# Patient Record
Sex: FEMALE | Race: WHITE | NOT HISPANIC OR LATINO | URBAN - METROPOLITAN AREA
[De-identification: names, ages, dates, MRNs, and addresses within clinical notes are randomized per-mention and may not be internally consistent; named-entity substitution may affect disease eponyms.]

---

## 2022-12-06 ENCOUNTER — TELEPHONE ENCOUNTER (OUTPATIENT)
Dept: URBAN - METROPOLITAN AREA CLINIC 27 | Facility: CLINIC | Age: 52
End: 2022-12-06

## 2022-12-06 ENCOUNTER — OFFICE VISIT (OUTPATIENT)
Dept: URBAN - METROPOLITAN AREA CLINIC 27 | Facility: CLINIC | Age: 52
End: 2022-12-06
Payer: COMMERCIAL

## 2022-12-06 ENCOUNTER — WEB ENCOUNTER (OUTPATIENT)
Dept: URBAN - METROPOLITAN AREA CLINIC 27 | Facility: CLINIC | Age: 52
End: 2022-12-06

## 2022-12-06 ENCOUNTER — DASHBOARD ENCOUNTERS (OUTPATIENT)
Age: 52
End: 2022-12-06

## 2022-12-06 VITALS
BODY MASS INDEX: 28.51 KG/M2 | HEART RATE: 84 BPM | HEIGHT: 64 IN | WEIGHT: 167 LBS | SYSTOLIC BLOOD PRESSURE: 137 MMHG | DIASTOLIC BLOOD PRESSURE: 100 MMHG

## 2022-12-06 DIAGNOSIS — Z83.71 FAMILY HISTORY OF COLONIC POLYPS: ICD-10-CM

## 2022-12-06 DIAGNOSIS — R13.10 DYSPHAGIA: ICD-10-CM

## 2022-12-06 DIAGNOSIS — R10.9 RIGHT SIDED ABDOMINAL PAIN: ICD-10-CM

## 2022-12-06 DIAGNOSIS — K21.9 GERD: ICD-10-CM

## 2022-12-06 DIAGNOSIS — R19.7 DIARRHEA: ICD-10-CM

## 2022-12-06 PROCEDURE — 99244 OFF/OP CNSLTJ NEW/EST MOD 40: CPT | Performed by: INTERNAL MEDICINE

## 2022-12-06 PROCEDURE — 99204 OFFICE O/P NEW MOD 45 MIN: CPT | Performed by: INTERNAL MEDICINE

## 2022-12-06 RX ORDER — HYOSCYAMINE SULFATE 0.12 MG/1
1 OR 2 TABLETS AS NEEDED FOR ABDOMINAL PAIN TABLET ORAL EVERY 6 HOURS
Qty: 90 | Refills: 2 | OUTPATIENT
Start: 2022-12-06 | End: 2023-03-06

## 2022-12-06 RX ORDER — PANTOPRAZOLE SODIUM 40 MG/1
1 TABLET TABLET, DELAYED RELEASE ORAL TWICE A DAY
Qty: 60 TABLET | Refills: 2 | OUTPATIENT

## 2022-12-06 NOTE — HPI-TODAY'S VISIT:
Patient here at the request of Pepe Peñaloza and Yuly for evaluation of multiple GI symptoms that have been present for the past few months.  She has relatively constant right-sided abdominal pain which waxes and wanes.  She denies any exacerbating factors, although sometimes p.o. intake will cause a "heavy feeling".  It is sometimes better with movement.  It is sometimes nocturnal.  PRN Tylenol has been helpful.  The pain has not worsened over the past 3 months.  It is relatively deep.  She has persistent reflux symptoms for which she is currently taking OTC Nexium once daily.  This is not effective.  She is not adherent to an antireflux regimen.  She has occasional mild dysphagia/globus type sensation symptoms.  She does not need to regurgitate the food bolus.  She denies significant lodging of the food bolus.  She had some "black stools" last month but her stools appear to be normal in color now.  She has not had any dark/bright red rectal bleeding.  She often has loose stools; she will sometimes have 2-3 loose/liquidy stools in the morning.  They are not nocturnal.  She is not currently using Imodium.  She does take a probiotic and a fiber supplement.  She has gained nearly 40 pounds over the past 3 years.  She does have mild anxiety but does not think that this plays a role in her GI symptoms.  She has not had a prior upper endoscopy but last underwent colonoscopy 5 years ago; no polyps.  Recent abdominal sonography showed hepatomegaly with diffuse fatty infiltration.  Postcholecystectomy changes were seen with a common bile duct measuring 8.5 mm.  Recent CBC and CMP were normal.  There is no family history of colon cancer but her mother, father and brother all had polyps.

## 2022-12-07 ENCOUNTER — LAB OUTSIDE AN ENCOUNTER (OUTPATIENT)
Dept: URBAN - METROPOLITAN AREA CLINIC 27 | Facility: CLINIC | Age: 52
End: 2022-12-07

## 2022-12-07 PROBLEM — 235595009 GASTROESOPHAGEAL REFLUX DISEASE: Status: ACTIVE | Noted: 2022-12-06

## 2022-12-07 PROBLEM — 40739000 DYSPHAGIA: Status: ACTIVE | Noted: 2022-12-06

## 2022-12-07 PROBLEM — 38341003 HYPERTENSION: Status: ACTIVE | Noted: 2022-12-06

## 2022-12-20 ENCOUNTER — WEB ENCOUNTER (OUTPATIENT)
Dept: URBAN - METROPOLITAN AREA SURGERY CENTER 7 | Facility: SURGERY CENTER | Age: 52
End: 2022-12-20

## 2022-12-22 ENCOUNTER — CLAIMS CREATED FROM THE CLAIM WINDOW (OUTPATIENT)
Dept: URBAN - METROPOLITAN AREA SURGERY CENTER 7 | Facility: SURGERY CENTER | Age: 52
End: 2022-12-22
Payer: COMMERCIAL

## 2022-12-22 ENCOUNTER — CLAIMS CREATED FROM THE CLAIM WINDOW (OUTPATIENT)
Dept: URBAN - METROPOLITAN AREA CLINIC 4 | Facility: CLINIC | Age: 52
End: 2022-12-22
Payer: COMMERCIAL

## 2022-12-22 ENCOUNTER — OFFICE VISIT (OUTPATIENT)
Dept: URBAN - METROPOLITAN AREA SURGERY CENTER 7 | Facility: SURGERY CENTER | Age: 52
End: 2022-12-22

## 2022-12-22 ENCOUNTER — TELEPHONE ENCOUNTER (OUTPATIENT)
Dept: URBAN - METROPOLITAN AREA CLINIC 27 | Facility: CLINIC | Age: 52
End: 2022-12-22

## 2022-12-22 DIAGNOSIS — R10.11 ABDOMINAL BURNING SENSATION IN RIGHT UPPER QUADRANT: ICD-10-CM

## 2022-12-22 DIAGNOSIS — K22.89 DILATATION OF ESOPHAGUS: ICD-10-CM

## 2022-12-22 DIAGNOSIS — K31.89 ACQUIRED DEFORMITY OF DUODENUM: ICD-10-CM

## 2022-12-22 DIAGNOSIS — R12 BURNING REFLUX: ICD-10-CM

## 2022-12-22 DIAGNOSIS — R13.19 CERVICAL DYSPHAGIA: ICD-10-CM

## 2022-12-22 DIAGNOSIS — K29.70 GASTRITIS, UNSPECIFIED, WITHOUT BLEEDING: ICD-10-CM

## 2022-12-22 PROCEDURE — G8907 PT DOC NO EVENTS ON DISCHARG: HCPCS | Performed by: INTERNAL MEDICINE

## 2022-12-22 PROCEDURE — 43450 DILATE ESOPHAGUS 1/MULT PASS: CPT | Performed by: INTERNAL MEDICINE

## 2022-12-22 PROCEDURE — 43239 EGD BIOPSY SINGLE/MULTIPLE: CPT | Performed by: INTERNAL MEDICINE

## 2022-12-22 PROCEDURE — 88312 SPECIAL STAINS GROUP 1: CPT | Performed by: PATHOLOGY

## 2022-12-22 PROCEDURE — 88305 TISSUE EXAM BY PATHOLOGIST: CPT | Performed by: PATHOLOGY

## 2022-12-22 RX ORDER — PANTOPRAZOLE SODIUM 40 MG/1
1 TABLET TABLET, DELAYED RELEASE ORAL TWICE A DAY
Qty: 60 TABLET | Refills: 2 | Status: ACTIVE | COMMUNITY

## 2022-12-22 RX ORDER — HYOSCYAMINE SULFATE 0.12 MG/1
1 OR 2 TABLETS AS NEEDED FOR ABDOMINAL PAIN TABLET ORAL EVERY 6 HOURS
Qty: 90 | Refills: 2 | Status: ACTIVE | COMMUNITY
Start: 2022-12-06 | End: 2023-03-06

## 2023-01-03 ENCOUNTER — TELEPHONE ENCOUNTER (OUTPATIENT)
Dept: URBAN - METROPOLITAN AREA CLINIC 27 | Facility: CLINIC | Age: 53
End: 2023-01-03

## 2023-01-03 RX ORDER — POLYETHYLENE GLYCOL-3350 AND ELECTROLYTES 236; 6.74; 5.86; 2.97; 22.74 G/274.31G; G/274.31G; G/274.31G; G/274.31G; G/274.31G
AS DIRECTED POWDER, FOR SOLUTION ORAL ONCE
Qty: 1 KIT | Refills: 0 | OUTPATIENT
Start: 2023-01-03 | End: 2023-01-04

## 2023-01-03 RX ORDER — HYOSCYAMINE SULFATE 0.12 MG/1
1 OR 2 TABLETS AS NEEDED FOR ABDOMINAL PAIN TABLET ORAL EVERY 6 HOURS
Qty: 90 | Refills: 2 | Status: ACTIVE | COMMUNITY
Start: 2022-12-06 | End: 2023-03-06

## 2023-01-03 RX ORDER — PANTOPRAZOLE SODIUM 40 MG/1
1 TABLET TABLET, DELAYED RELEASE ORAL TWICE A DAY
Qty: 60 TABLET | Refills: 2 | Status: ACTIVE | COMMUNITY

## 2023-01-04 PROBLEM — 197321007 FATTY LIVER: Status: ACTIVE | Noted: 2022-12-06

## 2023-01-13 ENCOUNTER — CLAIMS CREATED FROM THE CLAIM WINDOW (OUTPATIENT)
Dept: URBAN - METROPOLITAN AREA CLINIC 4 | Facility: CLINIC | Age: 53
End: 2023-01-13
Payer: COMMERCIAL

## 2023-01-13 ENCOUNTER — OFFICE VISIT (OUTPATIENT)
Dept: URBAN - METROPOLITAN AREA SURGERY CENTER 7 | Facility: SURGERY CENTER | Age: 53
End: 2023-01-13
Payer: COMMERCIAL

## 2023-01-13 DIAGNOSIS — D12.2 ADENOMA OF ASCENDING COLON: ICD-10-CM

## 2023-01-13 DIAGNOSIS — Z83.71 FAMILY HISTORY OF COLON POLYPS: ICD-10-CM

## 2023-01-13 DIAGNOSIS — Z12.11 COLON CANCER SCREENING: ICD-10-CM

## 2023-01-13 DIAGNOSIS — D12.2 BENIGN NEOPLASM OF ASCENDING COLON: ICD-10-CM

## 2023-01-13 PROCEDURE — 45380 COLONOSCOPY AND BIOPSY: CPT | Performed by: INTERNAL MEDICINE

## 2023-01-13 PROCEDURE — 45384 COLONOSCOPY W/LESION REMOVAL: CPT | Performed by: INTERNAL MEDICINE

## 2023-01-13 PROCEDURE — G8907 PT DOC NO EVENTS ON DISCHARG: HCPCS | Performed by: INTERNAL MEDICINE

## 2023-01-13 PROCEDURE — 88305 TISSUE EXAM BY PATHOLOGIST: CPT | Performed by: PATHOLOGY

## 2023-01-13 RX ORDER — HYOSCYAMINE SULFATE 0.12 MG/1
1 OR 2 TABLETS AS NEEDED FOR ABDOMINAL PAIN TABLET ORAL EVERY 6 HOURS
Qty: 90 | Refills: 2 | Status: ACTIVE | COMMUNITY
Start: 2022-12-06 | End: 2023-03-06

## 2023-01-13 RX ORDER — PANTOPRAZOLE SODIUM 40 MG/1
1 TABLET TABLET, DELAYED RELEASE ORAL TWICE A DAY
Qty: 60 TABLET | Refills: 2 | Status: ACTIVE | COMMUNITY

## 2023-01-31 ENCOUNTER — ERX REFILL RESPONSE (OUTPATIENT)
Dept: URBAN - METROPOLITAN AREA CLINIC 27 | Facility: CLINIC | Age: 53
End: 2023-01-31

## 2023-01-31 RX ORDER — PANTOPRAZOLE SODIUM 40 MG/1
TAKE 1 TABLET BY MOUTH TWICE A DAY FOR 30 DAYS TABLET, DELAYED RELEASE ORAL
Qty: 60 TABLET | Refills: 2 | OUTPATIENT

## 2023-01-31 RX ORDER — PANTOPRAZOLE SODIUM 40 MG/1
TAKE 1 TABLET BY MOUTH TWICE A DAY FOR 30 DAYS TABLET, DELAYED RELEASE ORAL TWICE A DAY
Qty: 60 TABLET | Refills: 3 | OUTPATIENT

## 2023-04-20 ENCOUNTER — OFFICE VISIT (OUTPATIENT)
Dept: URBAN - METROPOLITAN AREA CLINIC 27 | Facility: CLINIC | Age: 53
End: 2023-04-20

## 2023-05-06 ENCOUNTER — ERX REFILL RESPONSE (OUTPATIENT)
Dept: URBAN - METROPOLITAN AREA CLINIC 27 | Facility: CLINIC | Age: 53
End: 2023-05-06

## 2023-05-06 RX ORDER — PANTOPRAZOLE SODIUM 40 MG/1
TAKE 1 TABLET BY MOUTH TWICE A DAY TABLET, DELAYED RELEASE ORAL
Qty: 180 TABLET | Refills: 0 | OUTPATIENT

## 2023-05-06 RX ORDER — PANTOPRAZOLE SODIUM 40 MG/1
TAKE 1 TABLET BY MOUTH TWICE A DAY TABLET, DELAYED RELEASE ORAL
Qty: 180 TABLET | Refills: 3 | OUTPATIENT

## 2023-05-19 ENCOUNTER — OFFICE VISIT (OUTPATIENT)
Dept: URBAN - METROPOLITAN AREA CLINIC 27 | Facility: CLINIC | Age: 53
End: 2023-05-19

## 2024-10-24 ENCOUNTER — OFFICE VISIT (OUTPATIENT)
Dept: URBAN - METROPOLITAN AREA CLINIC 27 | Facility: CLINIC | Age: 54
End: 2024-10-24
Payer: COMMERCIAL

## 2024-10-24 ENCOUNTER — LAB OUTSIDE AN ENCOUNTER (OUTPATIENT)
Dept: URBAN - METROPOLITAN AREA CLINIC 27 | Facility: CLINIC | Age: 54
End: 2024-10-24

## 2024-10-24 VITALS
HEART RATE: 95 BPM | SYSTOLIC BLOOD PRESSURE: 159 MMHG | DIASTOLIC BLOOD PRESSURE: 104 MMHG | HEIGHT: 64 IN | BODY MASS INDEX: 30.05 KG/M2 | WEIGHT: 176 LBS

## 2024-10-24 DIAGNOSIS — R10.11 RUQ ABDOMINAL PAIN: ICD-10-CM

## 2024-10-24 DIAGNOSIS — K21.9 CHRONIC GERD: ICD-10-CM

## 2024-10-24 PROCEDURE — 99214 OFFICE O/P EST MOD 30 MIN: CPT | Performed by: INTERNAL MEDICINE

## 2024-10-24 RX ORDER — PANTOPRAZOLE SODIUM 40 MG/1
TAKE 1 TABLET BY MOUTH TWICE A DAY TABLET, DELAYED RELEASE ORAL
Qty: 180 TABLET | Refills: 3 | Status: ACTIVE | COMMUNITY

## 2024-10-24 RX ORDER — PANTOPRAZOLE SODIUM 40 MG/1
1 TABLET 1/2 TO 1 HOUR BEFORE MORNING MEAL TABLET, DELAYED RELEASE ORAL ONCE A DAY
Qty: 30 | OUTPATIENT
Start: 2024-10-24

## 2024-10-24 NOTE — HPI-TODAY'S VISIT:
Ms. Quintanilla is a 54-year-old established patient who is followed by Dr. Spaulding for GERD and RUQ abd pain. She was in her usual state of health until 1 month ago. She reports 6/10 RUQ pain with radiation to her right subscapular region. The pain was initially intermittent, now constant. The RUQ pain is stabbingand the subscapular pain is aching in nature. The pain disturbs her sleep. Worse after eating, prolonged sitting and stress. Better when she is reclined or if she doesn't eat. Not affected by defecation. She tried an old anti-spasmodic she had at home which reduced her pain to 3/10. Two weeks ago, she vomited 20 times over 2 days. She is s/p cholecystectomy. EGD 2022 -hiatal hernia, gastritis.  Colonoscopy 2022 - Alcohol use on weekends 3-4 over the weekend. No tobacco use. FMHX history of colon polyps. No other known GI conditions.

## 2024-10-29 ENCOUNTER — WEB ENCOUNTER (OUTPATIENT)
Dept: URBAN - METROPOLITAN AREA CLINIC 27 | Facility: CLINIC | Age: 54
End: 2024-10-29

## 2024-10-29 LAB
A/G RATIO: 1.6
ALBUMIN: 4.1
ALKALINE PHOSPHATASE: 68
ALT (SGPT): 27
AMYLASE: 46
AST (SGOT): 20
BILIRUBIN, TOTAL: 0.3
BUN/CREATININE RATIO: (no result)
BUN: 9
CALCIUM: 9.4
CARBON DIOXIDE, TOTAL: 28
CHLORIDE: 105
CREATININE: 0.62
EGFR: 104
GLOBULIN, TOTAL: 2.5
GLUCOSE: 110
H PYLORI BREATH TEST: NOT DETECTED
HEMATOCRIT: 39.4
HEMOGLOBIN: 13.3
LIPASE: 28
MCH: 31.2
MCHC: 33.8
MCV: 92.5
MPV: 9.3
PLATELET COUNT: 449
POTASSIUM: 4.3
PROTEIN, TOTAL: 6.6
RDW: 13.4
RED BLOOD CELL COUNT: 4.26
SODIUM: 142
WHITE BLOOD CELL COUNT: 7

## 2024-10-30 ENCOUNTER — TELEPHONE ENCOUNTER (OUTPATIENT)
Dept: URBAN - METROPOLITAN AREA CLINIC 27 | Facility: CLINIC | Age: 54
End: 2024-10-30

## 2024-11-08 ENCOUNTER — OFFICE VISIT (OUTPATIENT)
Dept: URBAN - METROPOLITAN AREA CLINIC 27 | Facility: CLINIC | Age: 54
End: 2024-11-08

## 2024-11-15 ENCOUNTER — TELEPHONE ENCOUNTER (OUTPATIENT)
Dept: URBAN - METROPOLITAN AREA CLINIC 27 | Facility: CLINIC | Age: 54
End: 2024-11-15

## 2024-11-20 ENCOUNTER — OFFICE VISIT (OUTPATIENT)
Dept: URBAN - METROPOLITAN AREA CLINIC 27 | Facility: CLINIC | Age: 54
End: 2024-11-20

## 2024-11-21 ENCOUNTER — OFFICE VISIT (OUTPATIENT)
Dept: URBAN - METROPOLITAN AREA CLINIC 27 | Facility: CLINIC | Age: 54
End: 2024-11-21